# Patient Record
Sex: FEMALE | URBAN - METROPOLITAN AREA
[De-identification: names, ages, dates, MRNs, and addresses within clinical notes are randomized per-mention and may not be internally consistent; named-entity substitution may affect disease eponyms.]

---

## 2018-02-28 ENCOUNTER — APPOINTMENT (OUTPATIENT)
Age: 53
Setting detail: DERMATOLOGY
End: 2018-03-01

## 2018-02-28 DIAGNOSIS — L50.8 OTHER URTICARIA: ICD-10-CM

## 2018-02-28 PROCEDURE — OTHER TREATMENT REGIMEN: OTHER

## 2018-02-28 PROCEDURE — 99203 OFFICE O/P NEW LOW 30 MIN: CPT

## 2018-02-28 PROCEDURE — OTHER ADDITIONAL NOTES: OTHER

## 2018-02-28 PROCEDURE — OTHER PRESCRIPTION: OTHER

## 2018-02-28 PROCEDURE — OTHER COUNSELING: OTHER

## 2018-02-28 RX ORDER — RANITIDINE 150 MG/1
TABLET, FILM COATED ORAL
Qty: 60 | Refills: 1 | Status: ERX

## 2018-02-28 RX ORDER — HYDROXYZINE HYDROCHLORIDE 25 MG/1
TABLET, FILM COATED ORAL
Qty: 30 | Refills: 1 | Status: ERX

## 2018-02-28 RX ORDER — CETIRIZINE HCL/PSEUDOEPHEDRINE 5 MG-120MG
TABLET, EXTENDED RELEASE 12 HR ORAL BID
Qty: 60 | Refills: 1 | Status: ERX

## 2018-02-28 RX ORDER — TRIAMCINOLONE ACETONIDE 1 MG/G
CREAM TOPICAL
Qty: 1 | Refills: 0 | Status: ERX

## 2018-02-28 ASSESSMENT — LOCATION SIMPLE DESCRIPTION DERM
LOCATION SIMPLE: RIGHT THIGH
LOCATION SIMPLE: ABDOMEN
LOCATION SIMPLE: LEFT THIGH
LOCATION SIMPLE: LEFT BUTTOCK

## 2018-02-28 ASSESSMENT — LOCATION ZONE DERM
LOCATION ZONE: LEG
LOCATION ZONE: TRUNK

## 2018-02-28 ASSESSMENT — LOCATION DETAILED DESCRIPTION DERM
LOCATION DETAILED: LEFT BUTTOCK
LOCATION DETAILED: LEFT ANTERIOR DISTAL THIGH
LOCATION DETAILED: RIGHT ANTERIOR PROXIMAL THIGH
LOCATION DETAILED: PERIUMBILICAL SKIN

## 2018-02-28 NOTE — HPI: HIVES (URTICARIA)
Additional History: Patient recently treated for uti at [facility] walk in clinic 2/10. Rx keflex. Hives. Allergic reaction .. went back to walk in clinic.. 2/12/2018. Rx given for nitrofurantoin.  Allergic reaction.    Medrol dose luzma started  Monday.  Last Thursday patient saw PCP .. cultures negative for STDs.   Hives were present before antibiotics but the abx made it worse \\n\\nFirst noticed this 1 week after drinking high amounts of lemon and lime water. Also grandson noticed itching after starting this as well. Additional History: Patient recently treated for uti at [facility] walk in clinic 2/10. Rx keflex. Hives. Allergic reaction .. went back to walk in clinic.. 2/12/2018. Rx given for nitrofurantoin.  Allergic reaction.    Medrol dose luzma started  Monday.  Last Thursday patient saw PCP .. cultures negative for STDs.   Hives were present before antibiotics but the abx made it worse \\n\\nFirst noticed this 1 week after drinking high amounts of lemon and lime water. Also grandson noticed itching after starting this as well.

## 2018-02-28 NOTE — HPI: HIVES (URTICARIA)
What Type Of Medication? (E.G. Antibiotic / Ibuprofen / Aspirin / Painkiller / Etc..): Keflex and nitrofurantoin uti treatment 2/ 10 and 2/12/2018

## 2018-02-28 NOTE — HPI: HIVES (URTICARIA)
Please Select The Phrase That Best Describes Your Hives.: individual welts stay in the same place for more than 24 hours

## 2019-01-16 ENCOUNTER — APPOINTMENT (OUTPATIENT)
Age: 54
Setting detail: DERMATOLOGY
End: 2019-01-17

## 2019-01-16 PROBLEM — L28.0 LICHEN SIMPLEX CHRONICUS: Status: ACTIVE | Noted: 2019-01-16

## 2019-01-16 PROCEDURE — OTHER PRESCRIPTION: OTHER

## 2019-01-16 PROCEDURE — 99213 OFFICE O/P EST LOW 20 MIN: CPT

## 2019-01-16 PROCEDURE — OTHER REASSURANCE: OTHER

## 2019-01-16 RX ORDER — HYDROCORTISONE 25 MG/G
CREAM TOPICAL
Qty: 1 | Refills: 1 | Status: ERX

## 2019-01-16 RX ORDER — CETIRIZINE HYDROCHLORIDE 10 MG/1
TABLET, FILM COATED ORAL
Qty: 60 | Refills: 3 | Status: ERX | COMMUNITY
Start: 2019-01-16

## 2019-01-16 NOTE — PROCEDURE: REASSURANCE
Hide Additional Notes?: No
Detail Level: Zone
Additional Notes (Optional): Pt has h/o seasonal allergies and eye irritation. Rubbing?

## 2019-01-16 NOTE — HPI: DISCOLORATION
How Severe Is Your Skin Discoloration?: moderate
Additional History: Patient complains of dark pigmentation under eyes.  Also mildly to forehead.  States area is not symptomatic but states eyes itch and she frequently rubs eyes.

## 2019-01-23 ENCOUNTER — RX ONLY (RX ONLY)
Age: 54
End: 2019-01-23

## 2019-01-23 ENCOUNTER — APPOINTMENT (OUTPATIENT)
Age: 54
Setting detail: DERMATOLOGY
End: 2019-01-24

## 2019-01-23 DIAGNOSIS — L24 IRRITANT CONTACT DERMATITIS: ICD-10-CM

## 2019-01-23 PROBLEM — L24.9 IRRITANT CONTACT DERMATITIS, UNSPECIFIED CAUSE: Status: ACTIVE | Noted: 2019-01-23

## 2019-01-23 PROBLEM — L28.0 LICHEN SIMPLEX CHRONICUS: Status: ACTIVE | Noted: 2019-01-23

## 2019-01-23 PROCEDURE — OTHER ADDITIONAL NOTES: OTHER

## 2019-01-23 PROCEDURE — 99213 OFFICE O/P EST LOW 20 MIN: CPT

## 2019-01-23 PROCEDURE — OTHER REASSURANCE: OTHER

## 2019-01-23 PROCEDURE — OTHER PRESCRIPTION: OTHER

## 2019-01-23 RX ORDER — DESONIDE 0.5 MG/G
OINTMENT TOPICAL
Qty: 1 | Refills: 1 | Status: ERX | COMMUNITY
Start: 2019-01-23

## 2019-01-23 RX ORDER — CETIRIZINE HYDROCHLORIDE 10 MG/1
TABLET, FILM COATED ORAL
Qty: 60 | Refills: 3 | Status: ERX

## 2019-01-23 ASSESSMENT — LOCATION SIMPLE DESCRIPTION DERM
LOCATION SIMPLE: LEFT CHEEK
LOCATION SIMPLE: RIGHT CHEEK

## 2019-01-23 ASSESSMENT — LOCATION DETAILED DESCRIPTION DERM
LOCATION DETAILED: RIGHT MEDIAL MALAR CHEEK
LOCATION DETAILED: LEFT MEDIAL MALAR CHEEK

## 2019-01-23 ASSESSMENT — LOCATION ZONE DERM: LOCATION ZONE: FACE

## 2019-01-23 NOTE — HPI: RASH
What Type Of Note Output Would You Prefer (Optional)?: Standard Output
How Severe Is Your Rash?: moderate
Is This A New Presentation, Or A Follow-Up?: Rash
Additional History: States rash began about 4 days ago while using hydrocortisone cream for LSC under eyes.  Cream D/Cd..

## 2019-01-23 NOTE — PROCEDURE: REASSURANCE
Detail Level: Zone
Hide Additional Notes?: No
Additional Notes (Optional): Pt has h/o seasonal allergies and eye irritation. Rubbing?

## 2019-03-13 ENCOUNTER — APPOINTMENT (OUTPATIENT)
Age: 54
Setting detail: DERMATOLOGY
End: 2019-03-13

## 2019-03-13 PROBLEM — L81.4 OTHER MELANIN HYPERPIGMENTATION: Status: ACTIVE | Noted: 2019-03-13

## 2019-03-13 PROCEDURE — 99213 OFFICE O/P EST LOW 20 MIN: CPT

## 2019-03-13 PROCEDURE — OTHER OTHER: OTHER

## 2019-03-13 NOTE — PROCEDURE: OTHER
Other (Free Text): pt complains of dark under eyes and puffiness. Pt to get Teamine Eye Complex here. Recommended HQRA for face
Note Text (......Xxx Chief Complaint.): This diagnosis correlates with the
Detail Level: Zone